# Patient Record
Sex: MALE | Race: ASIAN
[De-identification: names, ages, dates, MRNs, and addresses within clinical notes are randomized per-mention and may not be internally consistent; named-entity substitution may affect disease eponyms.]

---

## 2018-01-13 ENCOUNTER — HOSPITAL ENCOUNTER (EMERGENCY)
Dept: HOSPITAL 91 - FTE | Age: 28
Discharge: LEFT BEFORE DISCHARGE | End: 2018-01-13
Payer: COMMERCIAL

## 2018-01-13 ENCOUNTER — HOSPITAL ENCOUNTER (EMERGENCY)
Age: 28
Discharge: LEFT BEFORE DISCHARGE | End: 2018-01-13

## 2018-01-13 DIAGNOSIS — R10.13: Primary | ICD-10-CM

## 2018-01-13 LAB
ADD MAN DIFF?: NO
ALANINE AMINOTRANSFERASE: 46 IU/L (ref 13–69)
ALBUMIN/GLOBULIN RATIO: 1.53
ALBUMIN: 4.9 G/DL (ref 3.3–4.9)
ALKALINE PHOSPHATASE: 65 IU/L (ref 42–121)
ANION GAP: 16 (ref 8–16)
ASPARTATE AMINO TRANSFERASE: 32 IU/L (ref 15–46)
BASOPHIL #: 0.1 10^3/UL (ref 0–0.1)
BASOPHILS %: 0.9 % (ref 0–2)
BILIRUBIN,DIRECT: 0 MG/DL (ref 0–0.2)
BILIRUBIN,TOTAL: 0.7 MG/DL (ref 0.2–1.3)
BLOOD UREA NITROGEN: 7 MG/DL (ref 7–20)
CALCIUM: 10 MG/DL (ref 8.4–10.2)
CARBON DIOXIDE: 28 MMOL/L (ref 21–31)
CHLORIDE: 99 MMOL/L (ref 97–110)
CREATININE: 0.95 MG/DL (ref 0.61–1.24)
EOSINOPHILS #: 0.1 10^3/UL (ref 0–0.5)
EOSINOPHILS %: 0.6 % (ref 0–7)
GLOBULIN: 3.2 G/DL (ref 1.3–3.2)
GLUCOSE: 103 MG/DL (ref 70–220)
HEMATOCRIT: 46.3 % (ref 42–52)
HEMOGLOBIN: 15.8 G/DL (ref 14–18)
LIPASE: 197 U/L (ref 23–300)
LYMPHOCYTES #: 2.2 10^3/UL (ref 0.8–2.9)
LYMPHOCYTES %: 27 % (ref 15–51)
MEAN CORPUSCULAR HEMOGLOBIN: 30.2 PG (ref 29–33)
MEAN CORPUSCULAR HGB CONC: 34.1 G/DL (ref 32–37)
MEAN CORPUSCULAR VOLUME: 88.5 FL (ref 82–101)
MEAN PLATELET VOLUME: 9.9 FL (ref 7.4–10.4)
MONOCYTE #: 1.1 10^3/UL (ref 0.3–0.9)
MONOCYTES %: 12.9 % (ref 0–11)
NEUTROPHIL #: 4.8 10^3/UL (ref 1.6–7.5)
NEUTROPHILS %: 58.2 % (ref 39–77)
NUCLEATED RED BLOOD CELLS #: 0 10^3/UL (ref 0–0)
NUCLEATED RED BLOOD CELLS%: 0 /100WBC (ref 0–0)
PLATELET COUNT: 271 10^3/UL (ref 140–415)
POTASSIUM: 3.6 MMOL/L (ref 3.5–5.1)
RED BLOOD COUNT: 5.23 10^6/UL (ref 4.7–6.1)
RED CELL DISTRIBUTION WIDTH: 13 % (ref 11.5–14.5)
SODIUM: 139 MMOL/L (ref 135–144)
TOTAL PROTEIN: 8.1 G/DL (ref 6.1–8.1)
WHITE BLOOD COUNT: 8.2 10^3/UL (ref 4.8–10.8)

## 2018-01-13 PROCEDURE — 83690 ASSAY OF LIPASE: CPT

## 2018-01-13 PROCEDURE — 99284 EMERGENCY DEPT VISIT MOD MDM: CPT

## 2018-01-13 PROCEDURE — 80053 COMPREHEN METABOLIC PANEL: CPT

## 2018-01-13 PROCEDURE — 85025 COMPLETE CBC W/AUTO DIFF WBC: CPT

## 2018-01-13 PROCEDURE — 76705 ECHO EXAM OF ABDOMEN: CPT

## 2018-01-13 PROCEDURE — 36415 COLL VENOUS BLD VENIPUNCTURE: CPT

## 2018-01-13 RX ADMIN — ALUMINUM HYDROXIDE, MAGNESIUM HYDROXIDE, DIMETHICONE 1 ML: 200; 200; 20 SUSPENSION ORAL at 10:58

## 2018-07-05 ENCOUNTER — HOSPITAL ENCOUNTER (OUTPATIENT)
Age: 28
Discharge: HOME | End: 2018-07-05

## 2018-07-05 ENCOUNTER — HOSPITAL ENCOUNTER (OUTPATIENT)
Dept: HOSPITAL 91 - GIL | Age: 28
Discharge: HOME | End: 2018-07-05
Payer: COMMERCIAL

## 2018-07-05 DIAGNOSIS — K29.50: Primary | ICD-10-CM

## 2018-07-05 PROCEDURE — 88312 SPECIAL STAINS GROUP 1: CPT

## 2018-07-05 PROCEDURE — 88305 TISSUE EXAM BY PATHOLOGIST: CPT

## 2018-07-05 PROCEDURE — 43239 EGD BIOPSY SINGLE/MULTIPLE: CPT

## 2019-03-19 ENCOUNTER — HOSPITAL ENCOUNTER (EMERGENCY)
Dept: HOSPITAL 10 - FTE | Age: 29
LOS: 1 days | Discharge: HOME | End: 2019-03-20
Payer: COMMERCIAL

## 2019-03-19 ENCOUNTER — HOSPITAL ENCOUNTER (EMERGENCY)
Dept: HOSPITAL 91 - FTE | Age: 29
LOS: 1 days | Discharge: HOME | End: 2019-03-20
Payer: COMMERCIAL

## 2019-03-19 VITALS
BODY MASS INDEX: 26.99 KG/M2 | HEIGHT: 70 IN | WEIGHT: 188.5 LBS | WEIGHT: 188.5 LBS | HEIGHT: 70 IN | BODY MASS INDEX: 26.99 KG/M2
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
## 2019-03-19 DIAGNOSIS — S92.344A: ICD-10-CM

## 2019-03-19 DIAGNOSIS — S92.334A: Primary | ICD-10-CM
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
## 2019-03-19 PROCEDURE — 73630 X-RAY EXAM OF FOOT: CPT

## 2019-03-19 PROCEDURE — 99283 EMERGENCY DEPT VISIT LOW MDM: CPT

## 2019-03-19 PROCEDURE — 29515 APPLICATION SHORT LEG SPLINT: CPT

## 2019-03-20 VITALS — HEART RATE: 65 BPM | DIASTOLIC BLOOD PRESSURE: 85 MMHG | RESPIRATION RATE: 18 BRPM | SYSTOLIC BLOOD PRESSURE: 132 MMHG

## 2019-03-20 NOTE — ERD
ER Documentation


Chief Complaint


Chief Complaint





Pt was bouldering and fell injurying r foot, no deformity





HPI


28-year-old male who has right foot pain.  He was bowling today and he fell di


rectly onto his foot.  No head injury or KO.  No other areas of his body hurt.  


He is unable to bear weight secondary to the pain.  No numbness or tingling.





ROS


All systems reviewed and are negative except as per history of present illness.





Medications


Home Meds


Active Scripts


Hydrocodone/Acetaminophen (Norco 5-325 Tablet) 1 Each Tablet, 1 TAB PO Q6H PRN 


for PAIN, #15 TAB


   Prov:YAS PEMBERTON PA-C         3/20/19


Ibuprofen* (Motrin*) 800 Mg Tab, 800 MG PO Q6, #30 TAB


   Prov:YAS PEMBERTON PA-C         3/20/19


Reported Medications


[Ranitidine]   No Conflict Check


   7/5/18


[Tums]   No Conflict Check


   7/5/18


[Fish Oil]   No Conflict Check


   7/5/18


[Calcium]   No Conflict Check


   7/5/18


[Mvi]   No Conflict Check


   7/5/18


[Prevacid]   No Conflict Check


   7/5/18





Allergies


Allergies:  


Coded Allergies:  


     No Known Allergy (Unverified , 1/13/18)





PMhx/Soc


Medical and Surgical Hx:  pt denies Medical Hx, pt denies Surgical Hx


History of Surgery:  No


Anesthesia Reaction:  No


Hx Neurological Disorder:  No


Hx Respiratory Disorders:  No


Hx Cardiac Disorders:  No


Hx Psychiatric Problems:  No


Hx Miscellaneous Medical Probl:  No


Hx Alcohol Use:  No


Hx Substance Use:  No


Hx Tobacco Use:  No


Smoking Status:  Never smoker





FmHx


Family History:  No diabetes





Physical Exam


Vitals





Vital Signs


  Date      Temp  Pulse  Resp  B/P (MAP)   Pulse Ox  O2          O2 Flow    FiO2


Time                                                 Delivery    Rate


   3/19/19  99.8     62    16      136/86        99


     22:35                          (103)





Physical Exam


Const:   No acute distress


Head:   Atraumatic 


Eyes:    Normal Conjunctiva


ENT:    Normal External Ears, Nose and Mouth.


Neck:               Full range of motion. No meningismus.


Resp:   Clear to auscultation bilaterally


Cardio:   Regular rate and rhythm, no murmurs


 Lower Extremity -right


 Skin:          No laceration


 Compartments:   Soft


 Motor:         Full active range of motion hip/knee/ankle/foot


 Sensation:      Intact to light touch FDWS/MF/LF/P surfaces.


 Bones:       Base of third and fourth metatarsals tender no bony abnormalities


 Joints:         No effusion or laxity


 Pulses/Perfusion:    2+ DP, Capillary refill < 2 seconds





Procedures/MDM


Patient has foot pain after trauma.  He declined pain medication.  He is 


neurovascularly intact.  X-rays show nondisplaced fracture of the third and 


fourth metatarsals.  He was placed in a short leg posterior splint and given 


crutches prescription for pain medication copy of x-rays and outpatient or


thopedic follow-up.  Patient counseled regarding my diagnostic impression and 


care plan. Prior to discharge all questions answered. Pt agrees with treatment 


plan and understands strict return precautions. Pt is instructed to follow up 


with primary care provider within 24-48 hours. Precautionary instructions 


provided including instructions to return to the ER if not improving or for any 


worsening or changing symptoms or concerns.





Departure


Diagnosis:  


   Primary Impression:  


   Metatarsal fracture


Condition:  Stable


Patient Instructions:  Fracture, Foot


Referrals:  


[facility] [place]


YOU HAVE RECEIVED A MEDICAL SCREENING EXAM AND THE RESULTS INDICATE THAT YOU DO 


NOT HAVE A CONDITION THAT REQUIRES URGENT TREATMENT IN THE EMERGENCY DEPARTMENT.





FURTHER EVALUATION AND TREATMENT OF YOUR CONDITION CAN WAIT UNTIL YOU ARE SEEN 


IN YOUR DOCTORS OFFICE WITHIN THE NEXT 1-2 DAYS. IT IS YOUR RESPONSIBILITY TO MA


KE AN APPOINTMENT FOR FOLOW-UP CARE.





IF YOU HAVE A PRIMARY DOCTOR


--you should call your primary doctor and schedule and appointment





IF YOU DO NOT HAVE A PRIMARY DOCTOR YOU CAN CALL OUR PHYSICIAN REFERRAL HOTLINE 


AT [phone].





IF YOU CAN NOT AFFORD TO SEE A PHYSICIAN YOU CAN CHOSE FROM THE FOLLOWING [facility]


 INSTITUTIONS:





[facility]


[address] [place] [address]





[facility]


[address] [place] [address]





[facility]


[address] [place] [address]





Additional Instructions:  


SPECIALIST:  YOU HAVE A MEDICAL CONDITION WHICH REQUIRES YOU TO SEE A   


SPECIALIST WITHIN THE NEXT 1-2 DAYS. PLEASE FOLLOW UP WITH YOUR PRIMARY 


PHYSICIAN FOR REFFERAL.IF YOU DO NOT HAVE A PRIMARY CARE PHYSICIAN AND/OR YOU 


CAN NOT AFFORD TO SEE A PHYSICIAN THE FOLLOWING RESOURCES HAVE BEEN SUPPLIED TO 


YOU. IT IS YOUR RESPONSIBILITY TO BE SEEN BY THE SPECIALIST











YAS PEMBERTON PA-C            Mar 20, 2019 01:00